# Patient Record
Sex: FEMALE | URBAN - METROPOLITAN AREA
[De-identification: names, ages, dates, MRNs, and addresses within clinical notes are randomized per-mention and may not be internally consistent; named-entity substitution may affect disease eponyms.]

---

## 2020-07-28 ENCOUNTER — OFFICE VISIT (OUTPATIENT)
Dept: URBAN - METROPOLITAN AREA CLINIC 115 | Facility: CLINIC | Age: 35
End: 2020-07-28
Payer: COMMERCIAL

## 2020-07-28 DIAGNOSIS — K92.1 RECTAL BLEEDING: ICD-10-CM

## 2020-07-28 DIAGNOSIS — K64.4 SKIN TAG OF PERIANAL REGION: ICD-10-CM

## 2020-07-28 DIAGNOSIS — K62.89 PROCTALGIA: ICD-10-CM

## 2020-07-28 DIAGNOSIS — E66.3 OVERWEIGHT: ICD-10-CM

## 2020-07-28 DIAGNOSIS — K59.01 CONSTIPATION: ICD-10-CM

## 2020-07-28 DIAGNOSIS — K60.2 ANAL FISSURE: ICD-10-CM

## 2020-07-28 PROCEDURE — 99204 OFFICE O/P NEW MOD 45 MIN: CPT | Performed by: INTERNAL MEDICINE

## 2020-07-28 PROCEDURE — G8427 DOCREV CUR MEDS BY ELIG CLIN: HCPCS | Performed by: INTERNAL MEDICINE

## 2020-07-28 PROCEDURE — G9903 PT SCRN TBCO ID AS NON USER: HCPCS | Performed by: INTERNAL MEDICINE

## 2020-07-28 PROCEDURE — G8417 CALC BMI ABV UP PARAM F/U: HCPCS | Performed by: INTERNAL MEDICINE

## 2020-07-28 RX ORDER — PNV NO.95/FERROUS FUM/FOLIC AC 28MG-0.8MG
1 TABLET TABLET ORAL ONCE A DAY
Status: ACTIVE | COMMUNITY

## 2020-07-28 RX ORDER — LACTULOSE 10 G/15ML
15 ML SOLUTION ORAL TWICE A DAY
Qty: 900 ML | OUTPATIENT
Start: 2020-07-28

## 2020-07-28 NOTE — HPI-TODAY'S VISIT:
The patient presents for an anal fissure.  Today, the patient presents for a possible anal fissure. She feels like she is "pooping glass" during a BM.  She had a first degree tear during childbirth 5 months ago. She reports a history of hemorrhoids and constipation. She has a BM/3-4 days, but tries to have one QD. She sees BRB in the stool. She spends 10-15 min on the commode. She uses Miralax 1?2 cap QD, Colace 200-300 mL QD, a glycerin supp, and a lidocaine cream. The patient has started taking epsom salt baths. She's previously tried enemas, Senekot, mag citrate, and Casara. Casara provided a benefit, but it didn't taste good she states. She never tried lactulose. She is eating oatmeal, flax seeds, berries, and fiber powders. She's previously tried drinking smoothies (almond milk, banana) without a significant benefit. She now uses a baby wipe after a BM. She denies nausea/vomiting.

## 2020-07-28 NOTE — PHYSICAL EXAM GASTROINTESTINAL
Rectal, probable small anterior fissure. increased rectal tone. no masses. no exterior hemorrhoids. no perianal tenderness. no blood in rectum .skin tags.Abdomen , soft, nontender, nondistended , no guarding or rigidity , no masses palpable , normal bowel sounds , Liver and Spleen , no hepatomegaly present , no hepatosplenomegaly , liver nontender , spleen not palpable

## 2020-08-01 ENCOUNTER — WEB ENCOUNTER (OUTPATIENT)
Dept: URBAN - METROPOLITAN AREA CLINIC 115 | Facility: CLINIC | Age: 35
End: 2020-08-01

## 2020-08-01 RX ORDER — LACTULOSE 10 G/15ML
15 ML SOLUTION ORAL TWICE A DAY
Qty: 900 ML | Refills: 5

## 2021-01-11 ENCOUNTER — OFFICE VISIT (OUTPATIENT)
Dept: URBAN - METROPOLITAN AREA CLINIC 82 | Facility: CLINIC | Age: 36
End: 2021-01-11

## 2021-01-27 ENCOUNTER — DASHBOARD ENCOUNTERS (OUTPATIENT)
Age: 36
End: 2021-01-27

## 2021-01-29 ENCOUNTER — OFFICE VISIT (OUTPATIENT)
Dept: URBAN - METROPOLITAN AREA CLINIC 82 | Facility: CLINIC | Age: 36
End: 2021-01-29

## 2021-01-29 RX ORDER — PNV NO.95/FERROUS FUM/FOLIC AC 28MG-0.8MG
1 TABLET TABLET ORAL ONCE A DAY
COMMUNITY

## 2021-01-29 RX ORDER — LACTULOSE 10 G/15ML
15 ML SOLUTION ORAL TWICE A DAY
Qty: 900 ML | Refills: 5 | COMMUNITY